# Patient Record
Sex: FEMALE | Race: BLACK OR AFRICAN AMERICAN | NOT HISPANIC OR LATINO | Employment: UNEMPLOYED | URBAN - METROPOLITAN AREA
[De-identification: names, ages, dates, MRNs, and addresses within clinical notes are randomized per-mention and may not be internally consistent; named-entity substitution may affect disease eponyms.]

---

## 2023-10-06 ENCOUNTER — HOSPITAL ENCOUNTER (EMERGENCY)
Facility: HOSPITAL | Age: 33
Discharge: HOME | End: 2023-10-06
Attending: EMERGENCY MEDICINE
Payer: COMMERCIAL

## 2023-10-06 VITALS
HEART RATE: 81 BPM | WEIGHT: 200 LBS | OXYGEN SATURATION: 98 % | RESPIRATION RATE: 16 BRPM | HEIGHT: 67 IN | SYSTOLIC BLOOD PRESSURE: 114 MMHG | BODY MASS INDEX: 31.39 KG/M2 | DIASTOLIC BLOOD PRESSURE: 75 MMHG | TEMPERATURE: 97.7 F

## 2023-10-06 DIAGNOSIS — G43.009 MIGRAINE WITHOUT AURA AND WITHOUT STATUS MIGRAINOSUS, NOT INTRACTABLE: ICD-10-CM

## 2023-10-06 DIAGNOSIS — R51.9 ACUTE NONINTRACTABLE HEADACHE, UNSPECIFIED HEADACHE TYPE: Primary | ICD-10-CM

## 2023-10-06 LAB
ALBUMIN SERPL BCP-MCNC: 4.4 G/DL (ref 3.4–5)
ALP SERPL-CCNC: 48 U/L (ref 33–110)
ALT SERPL W P-5'-P-CCNC: 14 U/L (ref 7–45)
ANION GAP SERPL CALC-SCNC: 15 MMOL/L (ref 10–20)
AST SERPL W P-5'-P-CCNC: 20 U/L (ref 9–39)
BASOPHILS # BLD AUTO: 0.02 X10*3/UL (ref 0–0.1)
BASOPHILS NFR BLD AUTO: 0.3 %
BILIRUB SERPL-MCNC: 0.5 MG/DL (ref 0–1.2)
BUN SERPL-MCNC: 13 MG/DL (ref 6–23)
CALCIUM SERPL-MCNC: 9.8 MG/DL (ref 8.6–10.6)
CHLORIDE SERPL-SCNC: 104 MMOL/L (ref 98–107)
CO2 SERPL-SCNC: 24 MMOL/L (ref 21–32)
CREAT SERPL-MCNC: 0.67 MG/DL (ref 0.5–1.05)
CRP SERPL-MCNC: 6.6 MG/DL
EOSINOPHIL # BLD AUTO: 0 X10*3/UL (ref 0–0.7)
EOSINOPHIL NFR BLD AUTO: 0 %
ERYTHROCYTE [DISTWIDTH] IN BLOOD BY AUTOMATED COUNT: 13.8 % (ref 11.5–14.5)
ERYTHROCYTE [SEDIMENTATION RATE] IN BLOOD BY WESTERGREN METHOD: 22 MM/H (ref 0–20)
FLUAV RNA RESP QL NAA+PROBE: NOT DETECTED
FLUBV RNA RESP QL NAA+PROBE: NOT DETECTED
GFR SERPL CREATININE-BSD FRML MDRD: >90 ML/MIN/1.73M*2
GLUCOSE SERPL-MCNC: 83 MG/DL (ref 74–99)
HCT VFR BLD AUTO: 34.2 % (ref 36–46)
HGB BLD-MCNC: 11.6 G/DL (ref 12–16)
IMM GRANULOCYTES # BLD AUTO: 0.02 X10*3/UL (ref 0–0.7)
IMM GRANULOCYTES NFR BLD AUTO: 0.3 % (ref 0–0.9)
LYMPHOCYTES # BLD AUTO: 1.13 X10*3/UL (ref 1.2–4.8)
LYMPHOCYTES NFR BLD AUTO: 17.2 %
MCH RBC QN AUTO: 29.5 PG (ref 26–34)
MCHC RBC AUTO-ENTMCNC: 33.9 G/DL (ref 32–36)
MCV RBC AUTO: 87 FL (ref 80–100)
MONOCYTES # BLD AUTO: 0.61 X10*3/UL (ref 0.1–1)
MONOCYTES NFR BLD AUTO: 9.3 %
NEUTROPHILS # BLD AUTO: 4.79 X10*3/UL (ref 1.2–7.7)
NEUTROPHILS NFR BLD AUTO: 72.9 %
NRBC BLD-RTO: 0 /100 WBCS (ref 0–0)
PLATELET # BLD AUTO: 294 X10*3/UL (ref 150–450)
PMV BLD AUTO: 9.9 FL (ref 7.5–11.5)
POTASSIUM SERPL-SCNC: 3.9 MMOL/L (ref 3.5–5.3)
PREGNANCY TEST URINE, POC: NEGATIVE
PROT SERPL-MCNC: 7.2 G/DL (ref 6.4–8.2)
RBC # BLD AUTO: 3.93 X10*6/UL (ref 4–5.2)
S PYO DNA THROAT QL NAA+PROBE: NOT DETECTED
SARS-COV-2 ORF1AB RESP QL NAA+PROBE: NOT DETECTED
SODIUM SERPL-SCNC: 139 MMOL/L (ref 136–145)
WBC # BLD AUTO: 6.6 X10*3/UL (ref 4.4–11.3)

## 2023-10-06 PROCEDURE — 87651 STREP A DNA AMP PROBE: CPT | Performed by: PHYSICIAN ASSISTANT

## 2023-10-06 PROCEDURE — 85652 RBC SED RATE AUTOMATED: CPT | Performed by: PHYSICIAN ASSISTANT

## 2023-10-06 PROCEDURE — 86140 C-REACTIVE PROTEIN: CPT | Performed by: PHYSICIAN ASSISTANT

## 2023-10-06 PROCEDURE — 99285 EMERGENCY DEPT VISIT HI MDM: CPT | Performed by: PHYSICIAN ASSISTANT

## 2023-10-06 PROCEDURE — 85025 COMPLETE CBC W/AUTO DIFF WBC: CPT | Performed by: PHYSICIAN ASSISTANT

## 2023-10-06 PROCEDURE — 87502 INFLUENZA DNA AMP PROBE: CPT | Performed by: PHYSICIAN ASSISTANT

## 2023-10-06 PROCEDURE — 2500000004 HC RX 250 GENERAL PHARMACY W/ HCPCS (ALT 636 FOR OP/ED): Mod: SE | Performed by: PHYSICIAN ASSISTANT

## 2023-10-06 PROCEDURE — 99284 EMERGENCY DEPT VISIT MOD MDM: CPT | Performed by: EMERGENCY MEDICINE

## 2023-10-06 PROCEDURE — 2500000004 HC RX 250 GENERAL PHARMACY W/ HCPCS (ALT 636 FOR OP/ED)

## 2023-10-06 PROCEDURE — 81025 URINE PREGNANCY TEST: CPT | Performed by: PHYSICIAN ASSISTANT

## 2023-10-06 PROCEDURE — 2500000004 HC RX 250 GENERAL PHARMACY W/ HCPCS (ALT 636 FOR OP/ED): Mod: SE

## 2023-10-06 PROCEDURE — 80053 COMPREHEN METABOLIC PANEL: CPT | Performed by: PHYSICIAN ASSISTANT

## 2023-10-06 PROCEDURE — 36415 COLL VENOUS BLD VENIPUNCTURE: CPT | Performed by: PHYSICIAN ASSISTANT

## 2023-10-06 PROCEDURE — 2500000001 HC RX 250 WO HCPCS SELF ADMINISTERED DRUGS (ALT 637 FOR MEDICARE OP): Mod: SE | Performed by: PHYSICIAN ASSISTANT

## 2023-10-06 RX ORDER — METOCLOPRAMIDE 10 MG/1
10 TABLET ORAL ONCE
Status: COMPLETED | OUTPATIENT
Start: 2023-10-06 | End: 2023-10-06

## 2023-10-06 RX ORDER — ACETAMINOPHEN 325 MG/1
975 TABLET ORAL ONCE
Status: COMPLETED | OUTPATIENT
Start: 2023-10-06 | End: 2023-10-06

## 2023-10-06 RX ORDER — KETOROLAC TROMETHAMINE 30 MG/ML
INJECTION, SOLUTION INTRAMUSCULAR; INTRAVENOUS
Status: COMPLETED
Start: 2023-10-06 | End: 2023-10-06

## 2023-10-06 RX ORDER — MAGNESIUM SULFATE HEPTAHYDRATE 40 MG/ML
2 INJECTION, SOLUTION INTRAVENOUS ONCE
Status: COMPLETED | OUTPATIENT
Start: 2023-10-06 | End: 2023-10-06

## 2023-10-06 RX ORDER — PROCHLORPERAZINE EDISYLATE 5 MG/ML
10 INJECTION INTRAMUSCULAR; INTRAVENOUS ONCE
Status: COMPLETED | OUTPATIENT
Start: 2023-10-06 | End: 2023-10-06

## 2023-10-06 RX ORDER — KETOROLAC TROMETHAMINE 30 MG/ML
30 INJECTION, SOLUTION INTRAMUSCULAR; INTRAVENOUS ONCE
Status: COMPLETED | OUTPATIENT
Start: 2023-10-06 | End: 2023-10-06

## 2023-10-06 RX ADMIN — KETOROLAC TROMETHAMINE 30 MG: 30 INJECTION, SOLUTION INTRAMUSCULAR; INTRAVENOUS at 12:18

## 2023-10-06 RX ADMIN — MAGNESIUM SULFATE HEPTAHYDRATE 2 G: 40 INJECTION, SOLUTION INTRAVENOUS at 21:01

## 2023-10-06 RX ADMIN — ACETAMINOPHEN 975 MG: 325 TABLET ORAL at 15:21

## 2023-10-06 RX ADMIN — PROCHLORPERAZINE EDISYLATE 10 MG: 5 INJECTION INTRAMUSCULAR; INTRAVENOUS at 21:01

## 2023-10-06 RX ADMIN — METOCLOPRAMIDE 10 MG: 10 TABLET ORAL at 15:21

## 2023-10-06 RX ADMIN — SODIUM CHLORIDE 1000 ML: 9 INJECTION, SOLUTION INTRAVENOUS at 17:06

## 2023-10-06 ASSESSMENT — PAIN SCALES - GENERAL: PAINLEVEL_OUTOF10: 8

## 2023-10-06 ASSESSMENT — COLUMBIA-SUICIDE SEVERITY RATING SCALE - C-SSRS
2. HAVE YOU ACTUALLY HAD ANY THOUGHTS OF KILLING YOURSELF?: NO
6. HAVE YOU EVER DONE ANYTHING, STARTED TO DO ANYTHING, OR PREPARED TO DO ANYTHING TO END YOUR LIFE?: NO
1. IN THE PAST MONTH, HAVE YOU WISHED YOU WERE DEAD OR WISHED YOU COULD GO TO SLEEP AND NOT WAKE UP?: NO

## 2023-10-06 NOTE — PROGRESS NOTES
"Rhea Yeung was handed off to me from the previous provided, Kusum ruiz, at 1700.  Please see previous provider's note for detailed H&P, MDM and prior ED course.     In brief, Rhea Yeung  is an 33 y.o. female with PMH of Migraines who initially presented to the ED for \"flu-like symptoms\" including bitemporal headaches, and generalized body aches x 3 days.  She denied any other associated infectious symptoms, vision changes, neck stiffness, rash, extremity weakness or numbness and reported no recent fall, head trauma or known sick contacts. Per EMR review, she did go to [facility] ED yesterday at which time she was noted to have a fever of 101.2, however patient denies having any fevers or chills and was afebrile upon arrival to [facility]ED today. Her COVID, flu and strep swabs were all negative.   Prior to handoff, treatment with IM Toradol was ineffective.  Therefore she was started on IV fluids, given p.o. Reglan and Tylenol all of which provided partial transient relief in her symptoms.  Basic labs including CRP and sed rate were also obtained.  Lab results are pending at end of previous provider shift.  Plan at time of signout was to reevaluate patient for improvement and/or pain control.    Whilst under my care, patient has remained hemodynamic stable. On my reevaluation she reports headache has returned and is now accompanied by diffuse dull aching neck pain.  No radiculopathy, neck stiffness or C-spine tenderness.  When discussing her symptoms, patient mentions current headache does feel similar to her prior migraine headaches which typically resolve after treatment with Excedrin.  However, patient states she did not take anything including Excedrin prior to coming to the ED.  She is not on any migraine prophylaxis at home and has never seen a neurologist.  Denies any associated fever, chills, nausea, vomiting, visual disturbances or eye pain.  Her lab work was notable for an elevated sed rate and CRP, which " were 22 and 6.6 respectively.  CBC demonstrated no leukocytosis or anemia.  CMP was also unremarkable for any electrolyte disturbances, elevated LFTs or CLEMENTE. Based on clinical presentation, I do suspect patient's headache to be consistent with migraine headache. Given that patient's vitals have remained stable and she lacks any infectious or systemic symptoms, I have very low suspicion for meningitis at this time.  since patient had partial relief with the Reglan, I medicated her with Compazine, IV mag as well as Excedrin. I discussed the lab results and updated treatment plan with patient who voiced understanding and is agreeable.     After patient was remedicated with the above, she endorsed significant improvement in her headache and stated ready to be discharged home.  Repeat neuro exam is still nonfocal and remainder physical exam was otherwise grossly unremarkable.  Patient was discussed and staffed with ED provider, Dr. Kalyn Zepeda, whom agrees with assessment and treatment plan.     Diagnosis: Migraine headache  Pt Disposition: Home going with prescriptions for Excedrin Migraine.  Patient was also advised to follow-up with neurology on outpatient basis for further evaluation and medication management of migraine headaches.  2 advised to follow-up with her PCP on as-needed basis.  Strict ED return precautions were provided and discussed with patient in detail prior to being discharged.  She was also provided with a work excuse for today.

## 2023-10-06 NOTE — Clinical Note
Rhea Yeung was seen and treated in our emergency department on 10/6/2023.  She may return to work on 10/06/2023.       If you have any questions or concerns, please don't hesitate to call.      Thalia Lora PA-C

## 2023-10-06 NOTE — ED PROVIDER NOTES
Rhea Yeung was handed off to me from the previous provided, ***, at ***.  Please see previous provider's note for detailed H&P, MDM and prior ED course.   In brief, Rhea Yeung  is an 33 y.o. female presenting for   Chief Complaint   Patient presents with    Flu Symptoms   .  At the time of signout we were awaiting:    Plan at time of signout was ***    Whilst under my care ***    Diagnosis: ***  Pt Disposition: [unfilled]

## 2023-10-06 NOTE — ED PROVIDER NOTES
Emergency Department Encounter  [facility] EMERGENCY MEDICINE    Patient: Rhea Yeung  MRN: [id number]  : 1990  Date of Evaluation: 10/6/2023  ED Provider: Kusum Rose PA-C      Chief Complaint       Chief Complaint   Patient presents with    Flu Symptoms     [place]    (Location/Symptom, Timing/Onset, Context/Setting, Quality, Duration, Modifying Factors, Severity) Note limiting factors.   Limitations to History: None  Historian: Patient  Records reviewed: EMR inpatient and outpatient notes, Care Everywhere      Rhea Yeung is a 33 y.o. female who presents to the emergency department reporting headache and body aches for the last 3 days. She reports that she went to the [facility] ED yesterday and had a fever at that time. She left without treatment complete and reports that she did not get any treatment or labs completed at that time. She describes the h/a as bilateral and temporal, similar to previous migraines in the past. Gradual onset. Relieved temporarily with motrin. She has had to be seen in the ED for migraine treatment before. She does not take any chronic medications for her migraines. She denies the worst headache of her life. Denies neck stiffness, sore throat, neck pain, ulcerations or skin changes. Denies any sinus pressure, congestion, cough. She does report that her daughter has viral symptoms with nasal congestion but otherwise denies sick contacts. She is concerned that she may have Covid because this is similar to how she felt when she had covid in the past. She denies any immunocompromise or DM. Denies asthma, lung disease or chronic medical conditions.       ROS:     Review of Systems  14 systems reviewed and otherwise acutely negative except as in the [place].          Past History     Past Medical History:   Diagnosis Date    Encounter for routine postpartum follow-up 2016    Postpartum exam    Encounter for supervision of other normal pregnancy, unspecified  trimester 10/19/2015    Pregnancy, subsequent    Other conditions influencing health status 11/12/2015    History of pregnancy    Other specified pregnancy related conditions, unspecified trimester 10/05/2015    Heartburn in pregnancy     No past surgical history on file.    Medications/Allergies     Previous Medications    No medications on file     No Known Allergies     Physical Exam       ED Triage Vitals [10/06/23 0945]   Temp Heart Rate Resp BP   36.5 °C (97.7 °F) 81 16 114/75      SpO2 Temp src Heart Rate Source Patient Position   98 % -- -- --      BP Location FiO2 (%)     Right arm --         Physical Exam    GENERAL:  The patient appears nourished and normally developed. Vital signs as documented.     HEENT:  Head normocephalic, atraumatic, EOMs intact, PERRLA, Mucous membranes moist. Nares patent without copious rhinorrhea.  No lymphadenopathy.No photophobia. Neck supple without tenderness or decreased ROM. Posterior oropharynx is unremarkable. No erythema or exudates.     PULMONARY:  Lungs are clear to auscultation, without any respiratory distress. Able to speak full sentences, no accessory muscle use    CARDIAC:   Normal rate. No murmurs, rubs or gallops    ABDOMEN:  Soft, non distended, non tender, BS positive x 4 quadrants, No rebound or guarding, no peritoneal signs, no CVA tenderness, no masses or organomegaly    MUSCULOSKELETAL:   Able to ambulate, Non edematous, with no obvious deformities. Pulses intact distal    SKIN:   Good color, with no significant rashes.  No pallor.    NEURO:  No obvious neurological deficits, normal sensation and strength bilaterally.  Able to follow commands, NIH 0, CN intact.        Diagnostics   Labs:  Labs Reviewed   GROUP A STREPTOCOCCUS, PCR - Normal       Result Value    Group A Strep PCR Not Detected     SARS-COV-2 PCR, SYMPTOMATIC - Normal    Coronavirus 2019, PCR Not Detected      Narrative:     This assay has received FDA Emergency Use Authorization (EUA) and  is only authorized for the duration of time that circumstances exist to justify the authorization of the emergency use of in vitro diagnostic tests for the detection of SARS-CoV-2 virus and/or diagnosis of COVID-19 infection under section 564(b)(1) of the Act, 21 U.S.C. 360bbb-3(b)(1). This assay is an in vitro diagnostic nucleic acid amplification test for the qualitative detection of SARS-CoV-2 from nasopharyngeal specimens and has been validated for use at [facility]. Negative results do not preclude COVID-19 infections and should not be used as the sole basis for diagnosis, treatment, or other management decisions.     INFLUENZA A AND B PCR - Normal    Flu A Result Not Detected      Flu B Result Not Detected      Narrative:     This assay is an in vitro diagnostic multiplex nucleic acid amplification test for the detection and discrimination of Influenza A & B from nasopharyngeal specimens, and has been validated for use at [facility]. Negative results do not preclude Influenza A/B infections, and should not be used as the sole basis for diagnosis, treatment, or other management decisions. If Influenza A/B and RSV PCR results are negative, testing for Parainfluenza virus, Adenovirus and Metapneumovirus is routinely performed for [facility] [place] and [place] in[place], and is available on other patients by placing an add-on request.   POCT PREGNANCY, URINE - Normal    Preg Test, Ur Negative     CBC WITH AUTO DIFFERENTIAL   COMPREHENSIVE METABOLIC PANEL   SEDIMENTATION RATE, AUTOMATED   C-REACTIVE PROTEIN     Radiographs:  No orders to display         Assessment   In brief, Rhea Yeung is a 33 y.o. female who presented to the emergency department with a headache and body aches for 3 days. Feels that it is similar to when she had Covid in the past. COVID/strep/flu negative. Meds given to relieve headache and body pain. Fluids ordered. Feel that  "meningitis is unlikely given her lack of meningeal symptoms, fever, photophobia, n/v and the fact that symptoms have been ongoing for 3 days.       Plan   Labs pending at time of sign out.   Given toradol 30 mg IM  Given tylenol 975 mg PO  Given Reglan 10 mg PO  NSB ordered  Labs pending  Pt dispo depending on labs and symptom relief.     Differentials   Meningitis  Migraine  Viral syndrome  Other acute meningeal and viral etiologies    ED Course   Visit Vitals  /75 (BP Location: Right arm)   Pulse 81   Temp 36.5 °C (97.7 °F)   Resp 16   Ht 1.702 m (5' 7\")   Wt 90.7 kg (200 lb)   SpO2 98%   BMI 31.32 kg/m²   BSA 2.07 m²       Medications - No data to display      Final Impression      1. Acute nonintractable headache, unspecified headache type        Pt signed out to oncoming team with labs pending.    Comment: Please note this report has been produced using speech recognition software and may contain errors related to that system including errors in grammar, punctuation, and spelling, as well as words and phrases that may be inappropriate.  If there are any questions or concerns please feel free to contact the dictating provider for clarification.    ERIN Bal PA-C  10/06/23 6924    "

## 2023-10-07 NOTE — DISCHARGE INSTRUCTIONS
Please call to schedule follow-up appointment with neurology for further evaluation and treatment of your migraine headaches.    I have prescribed you Excedrin since she said this has been helpful in the past.  Take this at first onset of your migraine headaches as needed until you see neurology.  They may choose to prescribe you an abortive medication such as sumatriptan.     As a general measure, is extremely important that you drink plenty of fluids as this can contribute to headaches and make them worse.    Follow-up your PCP as needed, if symptoms worsen.